# Patient Record
Sex: FEMALE | Race: WHITE | NOT HISPANIC OR LATINO | Employment: FULL TIME | URBAN - METROPOLITAN AREA
[De-identification: names, ages, dates, MRNs, and addresses within clinical notes are randomized per-mention and may not be internally consistent; named-entity substitution may affect disease eponyms.]

---

## 2021-09-02 ENCOUNTER — HOSPITAL ENCOUNTER (EMERGENCY)
Facility: CLINIC | Age: 26
Discharge: HOME | End: 2021-09-03
Attending: INTERNAL MEDICINE | Admitting: INTERNAL MEDICINE
Payer: COMMERCIAL

## 2021-09-02 DIAGNOSIS — F10.129 ALCOHOL ABUSE WITH INTOXICATION (H): ICD-10-CM

## 2021-09-02 DIAGNOSIS — F10.920 ALCOHOLIC INTOXICATION WITHOUT COMPLICATION (H): ICD-10-CM

## 2021-09-02 PROCEDURE — 99283 EMERGENCY DEPT VISIT LOW MDM: CPT | Performed by: INTERNAL MEDICINE

## 2021-09-02 PROCEDURE — 99283 EMERGENCY DEPT VISIT LOW MDM: CPT | Mod: GC | Performed by: INTERNAL MEDICINE

## 2021-09-02 PROCEDURE — 82075 ASSAY OF BREATH ETHANOL: CPT | Performed by: INTERNAL MEDICINE

## 2021-09-02 RX ORDER — SODIUM CHLORIDE, SODIUM LACTATE, POTASSIUM CHLORIDE, CALCIUM CHLORIDE 600; 310; 30; 20 MG/100ML; MG/100ML; MG/100ML; MG/100ML
1000 INJECTION, SOLUTION INTRAVENOUS CONTINUOUS
Status: DISCONTINUED | OUTPATIENT
Start: 2021-09-02 | End: 2021-09-03 | Stop reason: HOSPADM

## 2021-09-03 VITALS
DIASTOLIC BLOOD PRESSURE: 72 MMHG | OXYGEN SATURATION: 100 % | SYSTOLIC BLOOD PRESSURE: 121 MMHG | HEART RATE: 96 BPM | RESPIRATION RATE: 14 BRPM

## 2021-09-03 LAB — ALCOHOL BREATH TEST: 0.25 (ref 0–0.01)

## 2021-09-03 NOTE — ED PROVIDER NOTES
ED Provider Note  [facility]      History     Chief Complaint   Patient presents with     Alcohol Intoxication     HPI  Jordan Gastelum is a 25 year old female w/ no documented pmhx who was brought in by ambulance for acute alcohol intoxication. Per EMS, [place] from [place] bench. .     Pt history limited by current intoxication. She states [place] and was [place]. Unable to elaborate on how much she drank. Denies marijuana or other recreational drug use. Denies current pain or discomfort. Denies nausea, chest pain, shortness of breath, headache. Denies family or friends locally.      Review of Systems  Limited ROS performed- negative except as per HPI    Physical Exam   BP: 115/61  Pulse: 96  SpO2: 99 %  Physical Exam  General: Covered in emesis, engages in conversation, appears comfortable.   HEENT: Pupils equal and round, no scleral icterus.   CV: RRR, normal S1/S2, no m/r/g, 2+ radial pulses  Pulm: BLCTA, no noted cough, breath sounds symmetric  Abd: Soft, nontender, nondistended, BS+  Ext: Warm, no noted BL LE edema.   Neuro: Eye opening to voice. AOx2. Spontaneously moving all extremities. Occasional word slurring.      ED Course      Procedures       Results for orders placed or performed during the hospital encounter of 09/02/21   Alcohol breath test POCT     Status: Abnormal   Result Value Ref Range    Alcohol Breath Test 0.254 (A) 0.00 - 0.01     Medications   lactated ringers BOLUS 1,000 mL (has no administration in time range)   lactated ringers infusion (has no administration in time range)        Assessments & Plan (with Medical Decision Making)   Jordan Gastelum is a 26 yo F w/ no known pmhx brought into the ED for acute alcohol intoxication. Pt is hemodynamically stable and appears comfortable.     Will obtain a POCT alcohol breath test as a baseline to monitor for substance clearance. Additionally, given her  "intoxicated state, will start LR IVF.     As pt does not have family or friends she is able to call to safely discharge to at this time, she will remain in the emergency room until she is no longer intoxicated and can safely care for herself.       --    ED Attending Physician Attestation    I MALVIN BOB MD, cared for this patient with the Resident. I have performed a history and physical examination of the patient and discussed management with the resident. I reviewed the resident's documentation above and agree with the documented findings and plan of care.    Summary of HPI, PE, ED Course   Patient is a 25 year old female evaluated in the emergency department for alcohol intoxication and unsteady gait..Bearthylizer 0.27. She states she was drinking wine and a \"bunch of stuff\". SHe states she drinks once or twice/ week. Exam notable for dirt on left side of body. She is awake and alert at 0130 but intoxicated. HEENT: No trauma. Neck: supple. Back: Non tender. Lungs Clear. Abdomen: Soft. Extrem: No traumatic injuries. . ED course notable for intoxication needing observation until clinically sober.. After the completion of care in the emergency department, I anticipate discharge once clinically sober.    Critical Care & Procedures  Not applicable.    Medical Decision Making  The medical record was reviewed and interpreted.      MALVIN BOB MD  Emergency Medicine     The patient was signed out to Dr Quigley at 0130.      New Prescriptions    No medications on file       Final diagnoses:   Alcoholic intoxication without complication (H)     Alize Fermin MD   PGY-1  --    [facility] EMERGENCY DEPARTMENT  9/2/2021     Malvin Bob MD  09/03/21 0138    "

## 2021-09-03 NOTE — DISCHARGE INSTRUCTIONS
DISCHARGE RESOURCES:  -[facility] [phone] (detox), [phone] (outpatient & [facility])    -[facility] - self management for addiction recovery:  www.smartrecovery.org    -[facility]: 930.714.2897.  -[facility] [phone]   -Substance Abuse and Mental Health Services (www.samhsa.gov)  -Harm Reduction Coalition (www. Harmreduction.org)  -[place] Opioid Prevention Coalition: www.opioidcoalition.org  -Poison control 1-857.920.4816       Sober Support Group Information:  AA/NA & Sponsor/Support  -Alcoholics Anonymous (www.alcoholics-anonymous.org): for local information 24 hours/day  -AA Intergroup service office in [place] (http://www.aastpaul.org/) [phone]  -AA Intergroup service office in [facility]: [phone]. (http://www.aaminneaCadenceMDis.org/)  -Narcotics Anonymous (www.nam[place].org) (669) 855-4931   -Sober Fun Activities: www.[employer]ber[employer].activities.m0um0u.[employer]/[facility]//[facility] ([facility])  [facility] connects people seeking recovery to resources that help foster and sustain long-term recovery.  Whether you are seeking resources for treatment, transportation, housing, job training, education, health care or other pathways to recovery, [facility] is a great place to start.   Phone: [phone]. www.[employer].org (Great listing of all types of recovery and non-recovery related resources)

## 2021-09-03 NOTE — ED NOTES
Emergency Department Patient Sign-out       Brief HPI:  This is a 25 year old female signed out to me by Dr. Zarco .  See initial ED Provider note for details of the presentation.            Significant Events prior to my assuming care: Patient intoxicated. No complications. Needs to sober then discharge.      Exam:   Patient Vitals for the past 24 hrs:   BP Pulse SpO2   09/02/21 2309 115/61 96 99 %           ED RESULTS:   Results for orders placed or performed during the hospital encounter of 09/02/21 (from the past 24 hour(s))   Alcohol breath test POCT     Status: Abnormal    Collection Time: 09/03/21  1:11 AM   Result Value Ref Range    Alcohol Breath Test 0.254 (A) 0.00 - 0.01       ED MEDICATIONS:   Medications   lactated ringers BOLUS 1,000 mL (1,000 mLs Intravenous Not Given 9/3/21 0153)   lactated ringers infusion (has no administration in time range)         Impression:    ICD-10-CM    1. Alcoholic intoxication without complication (H)  F10.920        Plan:    Patient is ambulating without any difficulty.  She is clinically sober at this point.  She would like to be discharged I feel this is reasonable.  She has a safe mode of transport and a place to go..        MD Dann Shah, Angel Rosado MD  09/03/21 6978